# Patient Record
Sex: FEMALE | Race: OTHER | NOT HISPANIC OR LATINO
[De-identification: names, ages, dates, MRNs, and addresses within clinical notes are randomized per-mention and may not be internally consistent; named-entity substitution may affect disease eponyms.]

---

## 2017-01-17 ENCOUNTER — APPOINTMENT (OUTPATIENT)
Dept: OTOLARYNGOLOGY | Facility: CLINIC | Age: 67
End: 2017-01-17

## 2017-01-17 VITALS
HEART RATE: 77 BPM | HEIGHT: 60 IN | DIASTOLIC BLOOD PRESSURE: 73 MMHG | WEIGHT: 140 LBS | BODY MASS INDEX: 27.48 KG/M2 | SYSTOLIC BLOOD PRESSURE: 139 MMHG

## 2017-01-17 DIAGNOSIS — Z82.2 FAMILY HISTORY OF DEAFNESS AND HEARING LOSS: ICD-10-CM

## 2017-01-17 DIAGNOSIS — Z86.39 PERSONAL HISTORY OF OTHER ENDOCRINE, NUTRITIONAL AND METABOLIC DISEASE: ICD-10-CM

## 2017-01-17 DIAGNOSIS — Z88.9 ALLERGY STATUS TO UNSPECIFIED DRUGS, MEDICAMENTS AND BIOLOGICAL SUBSTANCES: ICD-10-CM

## 2017-01-17 DIAGNOSIS — Z78.9 OTHER SPECIFIED HEALTH STATUS: ICD-10-CM

## 2017-01-17 DIAGNOSIS — Z80.9 FAMILY HISTORY OF MALIGNANT NEOPLASM, UNSPECIFIED: ICD-10-CM

## 2018-07-24 PROBLEM — Z78.9 ALCOHOL USE: Status: ACTIVE | Noted: 2017-01-17

## 2018-09-06 ENCOUNTER — RESULT REVIEW (OUTPATIENT)
Age: 68
End: 2018-09-06

## 2020-10-06 ENCOUNTER — APPOINTMENT (OUTPATIENT)
Dept: OTOLARYNGOLOGY | Facility: CLINIC | Age: 70
End: 2020-10-06
Payer: MEDICARE

## 2020-10-06 VITALS
WEIGHT: 137 LBS | TEMPERATURE: 98.2 F | BODY MASS INDEX: 26.9 KG/M2 | SYSTOLIC BLOOD PRESSURE: 152 MMHG | DIASTOLIC BLOOD PRESSURE: 75 MMHG | HEART RATE: 84 BPM | HEIGHT: 60 IN

## 2020-10-06 PROCEDURE — 99204 OFFICE O/P NEW MOD 45 MIN: CPT | Mod: 25

## 2020-10-06 PROCEDURE — 69210 REMOVE IMPACTED EAR WAX UNI: CPT

## 2020-10-06 NOTE — PHYSICAL EXAM
[Midline] : trachea located in midline position [Normal] : no rashes [de-identified] : exostosis of the left EAC

## 2020-10-06 NOTE — HISTORY OF PRESENT ILLNESS
[de-identified] : PAtient has had left ear itching for the last few years that has been progressively worsening over the last few months. SHe was here for the same issue a few years ago. She does not have any pain in the ear but is sticking Qtips in the ears to try and relieve the itching. She has not had any issues with dizziness or ringing in the ears and carvajal snot think that she had any issues with changes in hearing since she was last here \par She does not have any nasal congestion on or runny nose

## 2020-10-06 NOTE — ASSESSMENT
[FreeTextEntry1] : Patient complaining of clogged ears on examination the right ear very narrow ear canal with a lot of wax post narrowness which was curetted out I gave her some drops and she will follow-up in 2 weeks to make sure that everything is okay.

## 2020-10-19 ENCOUNTER — APPOINTMENT (OUTPATIENT)
Dept: OTOLARYNGOLOGY | Facility: CLINIC | Age: 70
End: 2020-10-19
Payer: MEDICARE

## 2020-10-19 VITALS
TEMPERATURE: 98.1 F | SYSTOLIC BLOOD PRESSURE: 162 MMHG | BODY MASS INDEX: 26.9 KG/M2 | HEART RATE: 73 BPM | HEIGHT: 60 IN | WEIGHT: 137 LBS | DIASTOLIC BLOOD PRESSURE: 80 MMHG

## 2020-10-19 DIAGNOSIS — H61.812 EXOSTOSIS OF LEFT EXTERNAL CANAL: ICD-10-CM

## 2020-10-19 DIAGNOSIS — H61.22 IMPACTED CERUMEN, LEFT EAR: ICD-10-CM

## 2020-10-19 PROCEDURE — 99214 OFFICE O/P EST MOD 30 MIN: CPT | Mod: 25

## 2020-10-19 PROCEDURE — 69210 REMOVE IMPACTED EAR WAX UNI: CPT

## 2020-10-19 RX ORDER — LEVOTHYROXINE SODIUM 88 UG/1
88 TABLET ORAL
Qty: 90 | Refills: 0 | Status: ACTIVE | COMMUNITY
Start: 2020-09-15

## 2020-10-19 NOTE — HISTORY OF PRESENT ILLNESS
[de-identified] : Patient is here toady with continued issues with severe itching from the left ear. She does not have any pain and is not having any ringing in the ears or dizziness. The last time she put drops int he ear was about 3 days ago, but she used it for the full 5-7 days as prescribed. SHe does admit that the left is worse, but both ears are very itchy all day

## 2020-10-19 NOTE — ASSESSMENT
[FreeTextEntry1] : Follows up her ear canals finally look good but she continues to have it itchy problems put her on some Elocon cream to use as needed and hopefully this will prevent her from irritating her external auditory canals and causing additional issues.  She will follow-up with us as needed.

## 2020-10-19 NOTE — PHYSICAL EXAM
[Midline] : trachea located in midline position [Normal] : no rashes [de-identified] : exostosis of the left EAC

## 2020-12-09 ENCOUNTER — APPOINTMENT (OUTPATIENT)
Dept: ORTHOPEDIC SURGERY | Facility: CLINIC | Age: 70
End: 2020-12-09
Payer: MEDICARE

## 2020-12-09 DIAGNOSIS — S83.91XA SPRAIN OF UNSPECIFIED SITE OF RIGHT KNEE, INITIAL ENCOUNTER: ICD-10-CM

## 2020-12-09 PROCEDURE — 99203 OFFICE O/P NEW LOW 30 MIN: CPT

## 2020-12-09 PROCEDURE — 73564 X-RAY EXAM KNEE 4 OR MORE: CPT | Mod: RT

## 2020-12-09 NOTE — HISTORY OF PRESENT ILLNESS
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 Again to experience right knee pain and swelling. Reports decreased swelling but experiences intermittent giving out and pain over the lateral aspect of the patella. Denies neurovascular symptoms. Does have a preinjury history of intermittent right knee pain for many years

## 2020-12-09 NOTE — DISCUSSION/SUMMARY
[de-identified] : Impression; Posttraumatic effusion right knee with tenderness lateral facet patella rule out occult fracture or bone contusion Concurrent osteoarthritis right knee\par Plan MRI right knee

## 2020-12-09 NOTE — PHYSICAL EXAM
[de-identified] : Constitutional:Well nourished , well developed and in no acute distress\par Psychiatric: Alert and oriented to time place and person.Appropriate affect\par Respiratory: Unlabored respirations,no audible wheezing\par Cardiovascular: no leg swelling  ankle edema\par Vascular: no calf or thigh tenderness, \par Peripheral pulses; intact\par Skin:Head, neck, arms and lower extremities:no lesions or discoloration\par Lymphatics:No groin adenopathy\par Neurological: intact light touch sensation and grossly intact coordination and motor power.\par Knee;Right: Effusion 1+ neutral alignment flexion right 0/one 20 with pain left 0/1:30 ligaments intact moderate to marked focal tenderness lateral facet patella crepitus Dorsal pedal pulse 1+ is 70 extension 5/5 quadriceps and patellar tendon are intact [de-identified] : X-rays right knee 4 views demonstrates mild right lateral compartment degenerative narrowing small marginal osteophyte, lateral patellofemoral groove marginal osteophytes lateral patellofemoral joint space narrowing

## 2021-01-07 ENCOUNTER — OUTPATIENT (OUTPATIENT)
Dept: OUTPATIENT SERVICES | Facility: HOSPITAL | Age: 71
LOS: 1 days | End: 2021-01-07
Payer: MEDICARE

## 2021-01-07 ENCOUNTER — APPOINTMENT (OUTPATIENT)
Dept: MRI IMAGING | Facility: CLINIC | Age: 71
End: 2021-01-07
Payer: MEDICARE

## 2021-01-07 DIAGNOSIS — Z00.8 ENCOUNTER FOR OTHER GENERAL EXAMINATION: ICD-10-CM

## 2021-01-07 PROCEDURE — 73721 MRI JNT OF LWR EXTRE W/O DYE: CPT

## 2021-01-07 PROCEDURE — 73721 MRI JNT OF LWR EXTRE W/O DYE: CPT | Mod: 26,RT

## 2021-01-20 ENCOUNTER — APPOINTMENT (OUTPATIENT)
Dept: ORTHOPEDIC SURGERY | Facility: CLINIC | Age: 71
End: 2021-01-20
Payer: MEDICARE

## 2021-01-20 VITALS — WEIGHT: 138 LBS | HEIGHT: 61 IN | BODY MASS INDEX: 26.06 KG/M2

## 2021-01-20 PROCEDURE — 99212 OFFICE O/P EST SF 10 MIN: CPT

## 2021-04-01 ENCOUNTER — NON-APPOINTMENT (OUTPATIENT)
Age: 71
End: 2021-04-01

## 2021-04-02 ENCOUNTER — NON-APPOINTMENT (OUTPATIENT)
Age: 71
End: 2021-04-02

## 2021-04-12 ENCOUNTER — NON-APPOINTMENT (OUTPATIENT)
Age: 71
End: 2021-04-12

## 2021-04-12 ENCOUNTER — APPOINTMENT (OUTPATIENT)
Dept: CARDIOLOGY | Facility: CLINIC | Age: 71
End: 2021-04-12
Payer: MEDICARE

## 2021-04-12 VITALS
OXYGEN SATURATION: 98 % | HEART RATE: 72 BPM | SYSTOLIC BLOOD PRESSURE: 170 MMHG | BODY MASS INDEX: 27 KG/M2 | HEIGHT: 61 IN | DIASTOLIC BLOOD PRESSURE: 88 MMHG | WEIGHT: 143 LBS | TEMPERATURE: 96.9 F

## 2021-04-12 VITALS — DIASTOLIC BLOOD PRESSURE: 72 MMHG | SYSTOLIC BLOOD PRESSURE: 156 MMHG

## 2021-04-12 DIAGNOSIS — Z13.228 ENCOUNTER FOR SCREENING FOR OTHER METABOLIC DISORDERS: ICD-10-CM

## 2021-04-12 DIAGNOSIS — E04.9 NONTOXIC GOITER, UNSPECIFIED: ICD-10-CM

## 2021-04-12 DIAGNOSIS — Z85.42 PERSONAL HISTORY OF MALIGNANT NEOPLASM OF OTHER PARTS OF UTERUS: ICD-10-CM

## 2021-04-12 DIAGNOSIS — Z85.3 PERSONAL HISTORY OF MALIGNANT NEOPLASM OF BREAST: ICD-10-CM

## 2021-04-12 DIAGNOSIS — Z71.89 OTHER SPECIFIED COUNSELING: ICD-10-CM

## 2021-04-12 PROCEDURE — 93000 ELECTROCARDIOGRAM COMPLETE: CPT

## 2021-04-12 PROCEDURE — 99204 OFFICE O/P NEW MOD 45 MIN: CPT

## 2021-04-12 RX ORDER — AMLODIPINE BESYLATE 5 MG/1
5 TABLET ORAL DAILY
Qty: 90 | Refills: 1 | Status: ACTIVE | COMMUNITY
Start: 2021-04-12 | End: 1900-01-01

## 2021-04-12 NOTE — REASON FOR VISIT
[Initial Evaluation] : an initial evaluation of [Hypertension] : hypertension [Syncope] : syncope [FreeTextEntry1] : Cardiac Evaluation

## 2021-04-12 NOTE — PHYSICAL EXAM
[General Appearance - Well Developed] : well developed [Normal Appearance] : normal appearance [Well Groomed] : well groomed [No Deformities] : no deformities [General Appearance - Well Nourished] : well nourished [General Appearance - In No Acute Distress] : no acute distress [Normal Conjunctiva] : the conjunctiva exhibited no abnormalities [Eyelids - No Xanthelasma] : the eyelids demonstrated no xanthelasmas [Normal Jugular Venous A Waves Present] : normal jugular venous A waves present [Normal Jugular Venous V Waves Present] : normal jugular venous V waves present [No Jugular Venous Nevarez A Waves] : no jugular venous nevarez A waves [Heart Rate And Rhythm] : heart rate and rhythm were normal [Heart Sounds] : normal S1 and S2 [Murmurs] : no murmurs present [Respiration, Rhythm And Depth] : normal respiratory rhythm and effort [Exaggerated Use Of Accessory Muscles For Inspiration] : no accessory muscle use [Auscultation Breath Sounds / Voice Sounds] : lungs were clear to auscultation bilaterally [Abdomen Soft] : soft [Abdomen Tenderness] : non-tender [Abdomen Mass (___ Cm)] : no abdominal mass palpated [Abnormal Walk] : normal gait [Gait - Sufficient For Exercise Testing] : the gait was sufficient for exercise testing [Nail Clubbing] : no clubbing of the fingernails [Cyanosis, Localized] : no localized cyanosis [Petechial Hemorrhages (___cm)] : no petechial hemorrhages [Skin Color & Pigmentation] : normal skin color and pigmentation [] : no rash [No Venous Stasis] : no venous stasis [Skin Lesions] : no skin lesions [No Skin Ulcers] : no skin ulcer [No Xanthoma] : no  xanthoma was observed [Oriented To Time, Place, And Person] : oriented to person, place, and time [Affect] : the affect was normal [Mood] : the mood was normal [No Anxiety] : not feeling anxious [Normal Oral Mucosa] : normal oral mucosa [No Oral Pallor] : no oral pallor [No Oral Cyanosis] : no oral cyanosis

## 2021-04-12 NOTE — HISTORY OF PRESENT ILLNESS
[FreeTextEntry1] : This is a 71 year old lady with a PMH of HLD, Goiter, Breast CA, and Uterine CA presents today for cardiac evaluation. Patient states that on Monday March 29, 2021, she explains that she fell and does not remember anything else at that time. Patient denies hitting her head. Patient does not know if she lost consciousness. Patient also notes that her BP has been elevated for the past two weeks. Patient denies a headache at this time. Patient states that she is under significant stress at this time at home and with regards to her husbands health. Patient admits to occasional light  headedness. Today, Patient denies dyspnea, palpitations, chest pain, nausea, vomiting, dizziness. pt with hx of snoring

## 2021-04-16 ENCOUNTER — APPOINTMENT (OUTPATIENT)
Dept: CARDIOLOGY | Facility: CLINIC | Age: 71
End: 2021-04-16
Payer: MEDICARE

## 2021-04-16 DIAGNOSIS — E78.00 PURE HYPERCHOLESTEROLEMIA, UNSPECIFIED: ICD-10-CM

## 2021-04-16 LAB
25(OH)D3 SERPL-MCNC: 42.3 NG/ML
ALBUMIN SERPL ELPH-MCNC: 4.7 G/DL
ALP BLD-CCNC: 72 U/L
ALT SERPL-CCNC: 19 U/L
ANION GAP SERPL CALC-SCNC: 13 MMOL/L
APPEARANCE: CLEAR
AST SERPL-CCNC: 19 U/L
BACTERIA: NEGATIVE
BASOPHILS # BLD AUTO: 0.08 K/UL
BASOPHILS NFR BLD AUTO: 0.9 %
BILIRUB DIRECT SERPL-MCNC: 0.1 MG/DL
BILIRUB INDIRECT SERPL-MCNC: 0.2 MG/DL
BILIRUB SERPL-MCNC: 0.3 MG/DL
BILIRUBIN URINE: NEGATIVE
BLOOD URINE: NORMAL
BUN SERPL-MCNC: 13 MG/DL
CALCIUM OXALATE CRYSTALS: ABNORMAL
CALCIUM SERPL-MCNC: 10.3 MG/DL
CHLORIDE SERPL-SCNC: 103 MMOL/L
CHOLEST SERPL-MCNC: 228 MG/DL
CO2 SERPL-SCNC: 24 MMOL/L
COLOR: NORMAL
CREAT SERPL-MCNC: 0.65 MG/DL
EOSINOPHIL # BLD AUTO: 0.06 K/UL
EOSINOPHIL NFR BLD AUTO: 0.6 %
ESTIMATED AVERAGE GLUCOSE: 111 MG/DL
GLUCOSE QUALITATIVE U: NEGATIVE
GLUCOSE SERPL-MCNC: 92 MG/DL
HBA1C MFR BLD HPLC: 5.5 %
HCT VFR BLD CALC: 46.8 %
HDLC SERPL-MCNC: 55 MG/DL
HGB BLD-MCNC: 15 G/DL
HYALINE CASTS: 0 /LPF
IMM GRANULOCYTES NFR BLD AUTO: 0.2 %
KETONES URINE: NEGATIVE
LDLC SERPL CALC-MCNC: 153 MG/DL
LEUKOCYTE ESTERASE URINE: NEGATIVE
LYMPHOCYTES # BLD AUTO: 1.66 K/UL
LYMPHOCYTES NFR BLD AUTO: 17.8 %
MAN DIFF?: NORMAL
MCHC RBC-ENTMCNC: 28.2 PG
MCHC RBC-ENTMCNC: 32.1 GM/DL
MCV RBC AUTO: 88 FL
MICROSCOPIC-UA: NORMAL
MONOCYTES # BLD AUTO: 0.48 K/UL
MONOCYTES NFR BLD AUTO: 5.1 %
NEUTROPHILS # BLD AUTO: 7.04 K/UL
NEUTROPHILS NFR BLD AUTO: 75.4 %
NITRITE URINE: NEGATIVE
NONHDLC SERPL-MCNC: 173 MG/DL
PH URINE: 6.5
PLATELET # BLD AUTO: 313 K/UL
POTASSIUM SERPL-SCNC: 4.2 MMOL/L
PROT SERPL-MCNC: 8 G/DL
PROTEIN URINE: NORMAL
RBC # BLD: 5.32 M/UL
RBC # FLD: 13.2 %
RED BLOOD CELLS URINE: 5 /HPF
SODIUM SERPL-SCNC: 140 MMOL/L
SPECIFIC GRAVITY URINE: 1.02
SQUAMOUS EPITHELIAL CELLS: 7 /HPF
T4 FREE SERPL-MCNC: 1.7 NG/DL
TRIGL SERPL-MCNC: 96 MG/DL
TSH SERPL-ACNC: 0.83 UIU/ML
UROBILINOGEN URINE: NORMAL
WBC # FLD AUTO: 9.34 K/UL
WHITE BLOOD CELLS URINE: 2 /HPF

## 2021-04-16 PROCEDURE — 78452 HT MUSCLE IMAGE SPECT MULT: CPT

## 2021-04-16 PROCEDURE — 93015 CV STRESS TEST SUPVJ I&R: CPT

## 2021-04-16 PROCEDURE — A9500: CPT

## 2021-04-16 PROCEDURE — 93306 TTE W/DOPPLER COMPLETE: CPT

## 2021-04-16 PROCEDURE — 93880 EXTRACRANIAL BILAT STUDY: CPT

## 2021-04-18 LAB
APPEARANCE: CLEAR
BACTERIA: NEGATIVE
BILIRUBIN URINE: NEGATIVE
BLOOD URINE: ABNORMAL
COLOR: NORMAL
GLUCOSE QUALITATIVE U: NEGATIVE
HYALINE CASTS: 0 /LPF
KETONES URINE: NEGATIVE
LEUKOCYTE ESTERASE URINE: NEGATIVE
MICROSCOPIC-UA: NORMAL
NITRITE URINE: NEGATIVE
PH URINE: 6.5
PROTEIN URINE: NORMAL
RED BLOOD CELLS URINE: 1 /HPF
SPECIFIC GRAVITY URINE: 1.01
SQUAMOUS EPITHELIAL CELLS: 4 /HPF
UROBILINOGEN URINE: NORMAL
WHITE BLOOD CELLS URINE: 2 /HPF

## 2021-04-25 ENCOUNTER — APPOINTMENT (OUTPATIENT)
Dept: MRI IMAGING | Facility: CLINIC | Age: 71
End: 2021-04-25
Payer: MEDICARE

## 2021-04-25 ENCOUNTER — OUTPATIENT (OUTPATIENT)
Dept: OUTPATIENT SERVICES | Facility: HOSPITAL | Age: 71
LOS: 1 days | End: 2021-04-25
Payer: MEDICARE

## 2021-04-25 DIAGNOSIS — R55 SYNCOPE AND COLLAPSE: ICD-10-CM

## 2021-04-25 LAB — BACTERIA UR CULT: ABNORMAL

## 2021-04-25 PROCEDURE — 93224 XTRNL ECG REC UP TO 48 HRS: CPT

## 2021-04-25 PROCEDURE — 70551 MRI BRAIN STEM W/O DYE: CPT

## 2021-04-25 PROCEDURE — 70551 MRI BRAIN STEM W/O DYE: CPT | Mod: 26,MF

## 2021-04-25 PROCEDURE — G1004: CPT

## 2021-04-26 DIAGNOSIS — N39.0 URINARY TRACT INFECTION, SITE NOT SPECIFIED: ICD-10-CM

## 2021-05-06 ENCOUNTER — NON-APPOINTMENT (OUTPATIENT)
Age: 71
End: 2021-05-06

## 2021-05-07 ENCOUNTER — NON-APPOINTMENT (OUTPATIENT)
Age: 71
End: 2021-05-07

## 2021-05-26 ENCOUNTER — APPOINTMENT (OUTPATIENT)
Dept: CARDIOLOGY | Facility: CLINIC | Age: 71
End: 2021-05-26
Payer: MEDICARE

## 2021-05-26 VITALS
HEIGHT: 61 IN | BODY MASS INDEX: 27 KG/M2 | OXYGEN SATURATION: 97 % | TEMPERATURE: 98.9 F | SYSTOLIC BLOOD PRESSURE: 144 MMHG | HEART RATE: 78 BPM | WEIGHT: 143 LBS | DIASTOLIC BLOOD PRESSURE: 80 MMHG

## 2021-05-26 DIAGNOSIS — R55 SYNCOPE AND COLLAPSE: ICD-10-CM

## 2021-05-26 DIAGNOSIS — I10 ESSENTIAL (PRIMARY) HYPERTENSION: ICD-10-CM

## 2021-05-26 DIAGNOSIS — D22.9 MELANOCYTIC NEVI, UNSPECIFIED: ICD-10-CM

## 2021-05-26 DIAGNOSIS — E78.5 HYPERLIPIDEMIA, UNSPECIFIED: ICD-10-CM

## 2021-05-26 DIAGNOSIS — R42 DIZZINESS AND GIDDINESS: ICD-10-CM

## 2021-05-26 PROCEDURE — 99213 OFFICE O/P EST LOW 20 MIN: CPT

## 2021-05-26 NOTE — HISTORY OF PRESENT ILLNESS
[FreeTextEntry1] : This is a 71 year old lady who presents today for follow up after being seen in our office on April 12, 2021. Patient had complete cardiac workup including Nuclear, ECHO, Holter,  MR Head, Carotid Doppler and was seen by neurologist for workup for syncope. Patient reports no further episodes of syncope. Patient states that she is feeling good and  no longer has episodes of light headedness. Patient states that she has started the cholesterol medication and is feeling fine on the medication. Patient also taking the BP medication and is doing well on the medication with no symptoms associated. Patient was recently treated with ABX for UTI and is not experiencing any urinary symptoms. Patient to recheck UA and UC today. Patient to see urology for Microscopic hematuria. Patient for FLORINA evaluation for June 1, 2021.

## 2021-06-01 ENCOUNTER — APPOINTMENT (OUTPATIENT)
Dept: PULMONOLOGY | Facility: CLINIC | Age: 71
End: 2021-06-01
Payer: MEDICARE

## 2021-06-01 VITALS
HEART RATE: 110 BPM | SYSTOLIC BLOOD PRESSURE: 131 MMHG | OXYGEN SATURATION: 98 % | BODY MASS INDEX: 27 KG/M2 | RESPIRATION RATE: 16 BRPM | HEIGHT: 61 IN | DIASTOLIC BLOOD PRESSURE: 80 MMHG | TEMPERATURE: 97.6 F | WEIGHT: 143 LBS

## 2021-06-01 DIAGNOSIS — Z80.0 FAMILY HISTORY OF MALIGNANT NEOPLASM OF DIGESTIVE ORGANS: ICD-10-CM

## 2021-06-01 DIAGNOSIS — R06.83 SNORING: ICD-10-CM

## 2021-06-01 DIAGNOSIS — Z87.01 PERSONAL HISTORY OF PNEUMONIA (RECURRENT): ICD-10-CM

## 2021-06-01 DIAGNOSIS — Z86.39 PERSONAL HISTORY OF OTHER ENDOCRINE, NUTRITIONAL AND METABOLIC DISEASE: ICD-10-CM

## 2021-06-01 DIAGNOSIS — Z86.79 PERSONAL HISTORY OF OTHER DISEASES OF THE CIRCULATORY SYSTEM: ICD-10-CM

## 2021-06-01 DIAGNOSIS — Z87.898 PERSONAL HISTORY OF OTHER SPECIFIED CONDITIONS: ICD-10-CM

## 2021-06-01 DIAGNOSIS — Z82.3 FAMILY HISTORY OF STROKE: ICD-10-CM

## 2021-06-01 DIAGNOSIS — Z82.49 FAMILY HISTORY OF ISCHEMIC HEART DISEASE AND OTHER DISEASES OF THE CIRCULATORY SYSTEM: ICD-10-CM

## 2021-06-01 PROCEDURE — 99204 OFFICE O/P NEW MOD 45 MIN: CPT | Mod: 25

## 2021-06-01 PROCEDURE — 71046 X-RAY EXAM CHEST 2 VIEWS: CPT

## 2021-06-01 RX ORDER — NITROFURANTOIN (MONOHYDRATE/MACROCRYSTALS) 25; 75 MG/1; MG/1
100 CAPSULE ORAL
Qty: 14 | Refills: 0 | Status: DISCONTINUED | COMMUNITY
Start: 2021-04-26 | End: 2021-06-01

## 2021-06-01 NOTE — PHYSICAL EXAM
[No Acute Distress] : no acute distress [Normal Oropharynx] : normal oropharynx [III] : Mallampati Class: III [Normal Appearance] : normal appearance [No Neck Mass] : no neck mass [Normal Rate/Rhythm] : normal rate/rhythm [Murmur ___ / 6] : murmur [unfilled] / 6 [Normal S1, S2] : normal s1, s2 [No Resp Distress] : no resp distress [Clear to Auscultation Bilaterally] : clear to auscultation bilaterally [Benign] : benign [Normal Gait] : normal gait [No Clubbing] : no clubbing [No Cyanosis] : no cyanosis [No Edema] : no edema [FROM] : FROM [Normal Color/ Pigmentation] : normal color/ pigmentation [No Focal Deficits] : no focal deficits [Oriented x3] : oriented x3 [Normal Affect] : normal affect [TextBox_68] : I:E ratio 1:3; clear  [TextBox_80] : mild kyphosis

## 2021-06-01 NOTE — HISTORY OF PRESENT ILLNESS
[TextBox_4] : Ms. MARIN is a 71 year old female presenting to the office today for initial pulmonary evaluation. Her chief complaint is\par -she reports feeling generally well\par -she notes she has difficulty walking up stairs due to her knee\par -she states she has an occasional episode of dysphagia for the past 2 years - can occur at any meal. She tries to chew slowly and chew the food well. It can occur with solids or liquids. She describes it as she is unable to breathe or continue eating until she burps it out.\par -she notes she has occasion reflux (not food though)\par -she notes she has arthritis pain due to weather changes\par -she reports her energy level is 7-8/10\par -she could NOT fall asleep while watching a boring TV show\par -she could NOT fall asleep in a car\par -she notes her memory and concentration are good \par -she notes she has been snoring for a few years. Her  tells her it has gotten worse, and has witnessed apneas\par -she notes her vision is good\par -she states she once had PNA 30 years ago\par -she notes she has something in her throat - lower esophagus that she wants to clear. She has trouble clearing it, but once it clears, she feels better\par -she states she had an episode of syncope on 4/8-9/2021 with a crossover of stress\par -she notes she walks 2-3 miles per day\par -she denies any bronchitis, nocturia, allergy sx, chest pain, chest pressure, diarrhea, constipation, dysphagia, dizziness, leg swelling, leg pain, itchy eyes, itchy ears, heartburn, reflux, sour taste in the mouth, myalgias or arthralgias.

## 2021-06-01 NOTE — REVIEW OF SYSTEMS
[GERD] : gerd [Dysphagia] : dysphagia [Arthralgias] : arthralgias [Chronic Pain] : chronic pain [Negative] : Endocrine [EDS] : no EDS [Chest Discomfort] : no chest discomfort [Diarrhea] : no diarrhea [Constipation] : no constipation [Dizziness] : no dizziness [TextBox_69] : throat sensation when swallowing

## 2021-06-01 NOTE — ADDENDUM
[FreeTextEntry1] : Documented by Bakari Lozada acting as a scribe for Dr. Toney Bunch on 06/01/2021.\par \par All medical record entries made by the Scribe were at my, Dr. Toney Bunch's, direction and personally dictated by me on 06/01/2021. I have reviewed the chart and agree that the record accurately reflects my personal performance of the history, physical exam, assessment and plan. I have also personally directed, reviewed, and agree with the discharge instructions.

## 2021-06-01 NOTE — PROCEDURE
[FreeTextEntry1] : CXR reveals a normal sized heart; no evidence of infiltrate or effusion s/p left mastectomy\par \par MRI of brain revealed periventricular subcortical white matter chronic changes. No hemorrhage.\par \par Echo reveals normal grade 1 diastolic, dysfunction, mild LVH\par \par Carotid Dopplers nml , Less than 50% stenosis on the left ICA.\par \par -Images, blood work, and procedures reviewed in detail and discussed with patient in office.

## 2021-06-01 NOTE — ASSESSMENT
[FreeTextEntry1] : Ms. MARIN is a 71 year old female with a history of breast CA 40+ years ago s/p right mastectomy, uterine CA s/p hysterectomy and oophorectomy, HTN, HLD, goiter, osteoarthritis of the both knees, anxiety, who comes into the office today for pulmonary evaluation for ?FLORINA, snoring, ?LPR / esophageal spasm\par \par The patient's shortness of breath is multifactorial due to:\par -pulmonary disease \par      -pending PFT - doubting any disease\par -poor breathing mechanics \par -overweight/out of shape\par -?cardiac disease \par \par Problem 1: ?Esophageal Spasm / ?LPR\par -Protonix 40 mg before breakfast\par -consider esophageal motility test if symptom doesn’t clear\par \par -Rule of 2s: avoid eating too much, eating too fast, eating too late, eating too spicy, eating too lousy, eating two hours before bed.\par -Things to avoid including overeating, spicy foods, tight clothing, eating within three hours of bed, this list is not all inclusive. \par -For treatment of reflux, possible options discussed including diet control, H2 blockers, PPIs, as well as coating motility agents discussed as treatment options. Timing of meals and proximity of last meal to sleep were discussed. If symptoms persist, a formal gastrointestinal evaluation is needed. \par \par Problem 2: Snoring - r/o FLORINA\par -Recommended to complete a home sleep study due to her snoring, witnessed apneas\par Sleep apnea is associated with adverse clinical consequences which an affect most organ systems. Cardiovascular disease risk includes arrhythmias, atrial fibrillation, hypertension, coronary artery disease, and stroke. Metabolic disorders include diabetes type 2, non-alcoholic fatty liver disease. Mood disorder especially depression; and cognitive decline especially in the elderly. Associations with chronic reflux/Foley’s esophagus some but not all inclusive. \par -Reasons include arousal consistent with hypopnea; respiratory events most prominent in REM sleep or supine position; therefore sleep staging and body position are important for accurate diagnosis and estimation of AHI. \par \par Problem 3: Poor Mechanics of Breathing\par - Proper breathing techniques were reviewed with an emphasis of exhalation. Patient instructed to breath in for 1 second and out for four seconds. Patient was encouraged to not talk while walking. \par \par Problem 4: Overweight\par -Weight loss, exercise, and diet control were discussed and are highly encouraged. Treatment options were given such as, aqua therapy, and contacting a nutritionist. Recommended to use the elliptical, stationary bike, less use of treadmill. Mindful eating was explained to the patient Obesity is associated with worsening asthma, shortness of breath, and potential for cardiac disease, diabetes, and other underlying medical conditions. \par \par Problem 5: Cardiac\par -recommended to follow up with a cardiologist (Arpan)\par \par Problem 6: health maintenance\par -s/p influenza vaccine\par -recommended strep pneumonia vaccines after age 65: Prevnar-13 vaccine, followed by Pneumo vaccine 23 one year following\par -recommended early intervention for URIs\par -recommended regular osteoporosis evaluations\par -recommended early dermatological evaluations\par -recommended after the age of 50 to the age of 70, colonoscopy every 5 years \par \par  Follow up in 6-8 weeks\par -he  is recommended to call with any changes, questions, or concerns.

## 2021-06-02 ENCOUNTER — NON-APPOINTMENT (OUTPATIENT)
Age: 71
End: 2021-06-02

## 2021-06-02 LAB
ALBUMIN SERPL ELPH-MCNC: 4.7 G/DL
ALP BLD-CCNC: 72 U/L
ALT SERPL-CCNC: 22 U/L
ANION GAP SERPL CALC-SCNC: 14 MMOL/L
APPEARANCE: ABNORMAL
AST SERPL-CCNC: 24 U/L
BACTERIA UR CULT: NORMAL
BACTERIA: NEGATIVE
BILIRUB DIRECT SERPL-MCNC: 0.1 MG/DL
BILIRUB INDIRECT SERPL-MCNC: 0.3 MG/DL
BILIRUB SERPL-MCNC: 0.4 MG/DL
BILIRUBIN URINE: NEGATIVE
BLOOD URINE: NEGATIVE
BUN SERPL-MCNC: 13 MG/DL
CALCIUM OXALATE CRYSTALS: ABNORMAL
CALCIUM SERPL-MCNC: 9.9 MG/DL
CHLORIDE SERPL-SCNC: 103 MMOL/L
CK SERPL-CCNC: 105 U/L
CO2 SERPL-SCNC: 24 MMOL/L
COLOR: NORMAL
CREAT SERPL-MCNC: 0.53 MG/DL
GLUCOSE QUALITATIVE U: NEGATIVE
GLUCOSE SERPL-MCNC: 92 MG/DL
HYALINE CASTS: 0 /LPF
KETONES URINE: NEGATIVE
LDLC SERPL DIRECT ASSAY-MCNC: 76 MG/DL
LEUKOCYTE ESTERASE URINE: NEGATIVE
MICROSCOPIC-UA: NORMAL
NITRITE URINE: NEGATIVE
PH URINE: 6.5
POTASSIUM SERPL-SCNC: 3.6 MMOL/L
PROT SERPL-MCNC: 8 G/DL
PROTEIN URINE: NEGATIVE
RED BLOOD CELLS URINE: 3 /HPF
SODIUM SERPL-SCNC: 141 MMOL/L
SPECIFIC GRAVITY URINE: 1.02
SQUAMOUS EPITHELIAL CELLS: 1 /HPF
UROBILINOGEN URINE: NORMAL
WHITE BLOOD CELLS URINE: 1 /HPF

## 2021-06-28 ENCOUNTER — NON-APPOINTMENT (OUTPATIENT)
Age: 71
End: 2021-06-28

## 2021-07-10 ENCOUNTER — RX RENEWAL (OUTPATIENT)
Age: 71
End: 2021-07-10

## 2021-07-10 RX ORDER — ATORVASTATIN CALCIUM 20 MG/1
20 TABLET, FILM COATED ORAL DAILY
Qty: 90 | Refills: 0 | Status: ACTIVE | COMMUNITY
Start: 2021-04-16 | End: 1900-01-01

## 2021-07-17 ENCOUNTER — LABORATORY RESULT (OUTPATIENT)
Age: 71
End: 2021-07-17

## 2021-07-21 ENCOUNTER — APPOINTMENT (OUTPATIENT)
Dept: PULMONOLOGY | Facility: CLINIC | Age: 71
End: 2021-07-21
Payer: MEDICARE

## 2021-07-21 VITALS
TEMPERATURE: 97.3 F | DIASTOLIC BLOOD PRESSURE: 70 MMHG | HEIGHT: 60 IN | HEART RATE: 111 BPM | BODY MASS INDEX: 28.27 KG/M2 | OXYGEN SATURATION: 97 % | WEIGHT: 144 LBS | RESPIRATION RATE: 16 BRPM | SYSTOLIC BLOOD PRESSURE: 140 MMHG

## 2021-07-21 PROCEDURE — 95012 NITRIC OXIDE EXP GAS DETER: CPT

## 2021-07-21 PROCEDURE — ZZZZZ: CPT

## 2021-07-21 PROCEDURE — 94010 BREATHING CAPACITY TEST: CPT

## 2021-07-21 PROCEDURE — 94727 GAS DIL/WSHOT DETER LNG VOL: CPT

## 2021-07-21 PROCEDURE — 99214 OFFICE O/P EST MOD 30 MIN: CPT | Mod: 25

## 2021-07-21 PROCEDURE — 94729 DIFFUSING CAPACITY: CPT

## 2021-07-21 NOTE — HISTORY OF PRESENT ILLNESS
[TextBox_4] : Ms. MARIN is a 71 year old female presenting to the office today for f/p pulmonary evaluation. Her chief complaint is\par \par -she notes her energy levels are alright\par -She notes having an achy back \par -She notes having bad knees \par -She notes having issues walking up the stairs \par -She denies constipation \par -She denies dysphasia \par -She notes having some gassiness while eating but is controlled with rx prescribed \par -she notes gaining weight even though she eats less\par -She states she tries eating healthy \par - she completed an ECHO in April\par -She notes not passing out anymore \par \par \par patient denies any headaches, nausea, vomiting, fever, chills, sweats, chest pain, chest pressure, palpitations, coughing, wheezing, fatigue, diarrhea, constipation, dysphagia, myalgias, dizziness, leg swelling, leg pain, itchy eyes, itchy ears, heartburn, reflux or sour taste in the mouth

## 2021-07-21 NOTE — PROCEDURE
[FreeTextEntry1] : Sleep study (6/11/21) shows she stops breathing 33 times/hr with findings that are consistent with severe obstructive sleep apnea \par \par Full PFT revealed normal flows, with a FEV1 of 2.41L, which is 120% of predicted, normal lung volumes, and a diffusion of , which is % of predicted, with a normal flow volume loop \par \par \par Feno was 43; a normal value being less than 25. Fractional exhaled nitric oxide (FENO) is regarded as a simple, noninvasive method for assessing eosinophilic airway inflammation. Produced by a variety of cells within the lung, nitric oxide (NO) concentrations are generally low in healthy individuals. However, high concentrations of NO appear to be involved in nonspecific host defense mechanisms and chronic inflammatory  diseases such as asthma. The American Thoracic Society (ATS) therefore recommended using FENO to aid in the diagnosis and monitoring of eosinophilic airway inflammation and asthma, and for identifying steroid responsive individuals whose chronic respiratory symptoms may be caused by airway inflammation

## 2021-07-21 NOTE — ASSESSMENT
[FreeTextEntry1] : Ms. MARIN is a 71 year old female with a history of breast CA 40+ years ago s/p right mastectomy, uterine CA s/p hysterectomy and oophorectomy, HTN, HLD, goiter, osteoarthritis of the both knees, anxiety, who comes into the office today for pulmonary evaluation for (+)FLORINA, snoring, (+)LPR / esophageal spasm\par \par The patient's shortness of breath is multifactorial due to:\par -pulmonary disease \par      -pending PFT - doubting any disease\par -poor breathing mechanics \par -overweight/out of shape\par -?cardiac disease \par \par Problem 1: (+)Esophageal Spasm / (+)LPR\par -Protonix 40 mg before breakfast\par -consider esophageal motility test if symptom doesn’t clear\par \par -Rule of 2s: avoid eating too much, eating too fast, eating too late, eating too spicy, eating too lousy, eating two hours before bed.\par -Things to avoid including overeating, spicy foods, tight clothing, eating within three hours of bed, this list is not all inclusive. \par -For treatment of reflux, possible options discussed including diet control, H2 blockers, PPIs, as well as coating motility agents discussed as treatment options. Timing of meals and proximity of last meal to sleep were discussed. If symptoms persist, a formal gastrointestinal evaluation is needed. \par \par Problem 2: Snoring - (+) FLORINA severe\par -CPAP study pending \par -Recommended to complete a home sleep study due to her snoring, witnessed apneas\par Sleep apnea is associated with adverse clinical consequences which an affect most organ systems. Cardiovascular disease risk includes arrhythmias, atrial fibrillation, hypertension, coronary artery disease, and stroke. Metabolic disorders include diabetes type 2, non-alcoholic fatty liver disease. Mood disorder especially depression; and cognitive decline especially in the elderly. Associations with chronic reflux/Foley’s esophagus some but not all inclusive. \par -Reasons include arousal consistent with hypopnea; respiratory events most prominent in REM sleep or supine position; therefore sleep staging and body position are important for accurate diagnosis and estimation of AHI. \par \par Problem 3: Poor Mechanics of Breathing\par - Proper breathing techniques were reviewed with an emphasis of exhalation. Patient instructed to breath in for 1 second and out for four seconds. Patient was encouraged to not talk while walking. \par \par Problem 4: Overweight\par -Weight loss, exercise, and diet control were discussed and are highly encouraged. Treatment options were given such as, aqua therapy, and contacting a nutritionist. Recommended to use the elliptical, stationary bike, less use of treadmill. Mindful eating was explained to the patient Obesity is associated with worsening asthma, shortness of breath, and potential for cardiac disease, diabetes, and other underlying medical conditions. \par \par Problem 5: Cardiac\par - S/p Covid 19 vaccine (Pfizer) x2 \par -recommended to follow up with a cardiologist (Arpan)\par \par Problem 6: health maintenance\par -s/p influenza vaccine\par -recommended strep pneumonia vaccines after age 65: Prevnar-13 vaccine, followed by Pneumo vaccine 23 one year following\par -recommended early intervention for URIs\par -recommended regular osteoporosis evaluations\par -recommended early dermatological evaluations\par -recommended after the age of 50 to the age of 70, colonoscopy every 5 years \par \par  Follow up in 6-8 weeks\par -he  is recommended to call with any changes, questions, or concerns.

## 2021-07-21 NOTE — ADDENDUM
[FreeTextEntry1] : Documented by Moiz Dial acting as a scribe for Dr. Toney Bunch on (07/21/2021).\par \par All medical record entries made by the Scribe were at my, Dr. Toney Bunch's, direction and personally dictated by me on (07/21/2021). I have reviewed the chart and agree that the record accurately reflects my personal performance of the history, physical exam, assessment and plan. I have also personally directed, reviewed, and agree with the discharge instructions.\par

## 2021-07-22 ENCOUNTER — APPOINTMENT (OUTPATIENT)
Dept: UROLOGY | Facility: CLINIC | Age: 71
End: 2021-07-22
Payer: MEDICARE

## 2021-07-22 ENCOUNTER — RESULT REVIEW (OUTPATIENT)
Age: 71
End: 2021-07-22

## 2021-07-22 DIAGNOSIS — R82.90 UNSPECIFIED ABNORMAL FINDINGS IN URINE: ICD-10-CM

## 2021-07-22 PROCEDURE — 99204 OFFICE O/P NEW MOD 45 MIN: CPT

## 2021-07-25 PROBLEM — R82.90 ABNORMAL URINALYSIS: Status: ACTIVE | Noted: 2021-04-16

## 2021-07-25 NOTE — HISTORY OF PRESENT ILLNESS
[FreeTextEntry1] : 71 year old F with cc of microscopic hematuria. Pt was seen by PCP for routine exam and found to have blood in the urine. Review of UAs shows 3 and 5 RBC. She had CaOx crystals noted. She has never been told this in the past. Denies gross hematuria. Has a hx of stone x1 in the past that was tx with ESWL, >10y ago. No issues with UTIs. No tobacco hx. No exposure hx. No family hx of  malignancy. Father with colon ca. Personal hx of breast and uterine ca. Breast ca tx with mastectomy and radiation. Neither with chemo. \par \par At baseline, no urinary complaints. Can hold her urine.

## 2021-07-30 ENCOUNTER — OUTPATIENT (OUTPATIENT)
Dept: OUTPATIENT SERVICES | Facility: HOSPITAL | Age: 71
LOS: 1 days | End: 2021-07-30
Payer: MEDICARE

## 2021-07-30 ENCOUNTER — APPOINTMENT (OUTPATIENT)
Dept: CT IMAGING | Facility: CLINIC | Age: 71
End: 2021-07-30
Payer: MEDICARE

## 2021-07-30 DIAGNOSIS — R31.29 OTHER MICROSCOPIC HEMATURIA: ICD-10-CM

## 2021-07-30 PROCEDURE — 82565 ASSAY OF CREATININE: CPT

## 2021-07-30 PROCEDURE — 74178 CT ABD&PLV WO CNTR FLWD CNTR: CPT | Mod: 26,MH

## 2021-07-30 PROCEDURE — 74178 CT ABD&PLV WO CNTR FLWD CNTR: CPT

## 2021-08-26 ENCOUNTER — RESULT REVIEW (OUTPATIENT)
Age: 71
End: 2021-08-26

## 2021-09-14 ENCOUNTER — APPOINTMENT (OUTPATIENT)
Dept: ORTHOPEDIC SURGERY | Facility: CLINIC | Age: 71
End: 2021-09-14
Payer: MEDICARE

## 2021-09-14 VITALS
BODY MASS INDEX: 26.81 KG/M2 | HEART RATE: 92 BPM | SYSTOLIC BLOOD PRESSURE: 142 MMHG | WEIGHT: 142 LBS | HEIGHT: 61 IN | DIASTOLIC BLOOD PRESSURE: 76 MMHG | OXYGEN SATURATION: 96 %

## 2021-09-14 DIAGNOSIS — M17.11 UNILATERAL PRIMARY OSTEOARTHRITIS, RIGHT KNEE: ICD-10-CM

## 2021-09-14 DIAGNOSIS — I10 ESSENTIAL (PRIMARY) HYPERTENSION: ICD-10-CM

## 2021-09-14 PROCEDURE — 73564 X-RAY EXAM KNEE 4 OR MORE: CPT | Mod: RT

## 2021-09-14 PROCEDURE — 99213 OFFICE O/P EST LOW 20 MIN: CPT

## 2021-09-14 RX ORDER — DICLOFENAC SODIUM 75 MG/1
75 TABLET, DELAYED RELEASE ORAL
Qty: 60 | Refills: 0 | Status: ACTIVE | COMMUNITY
Start: 2021-09-14 | End: 1900-01-01

## 2021-09-14 NOTE — HISTORY OF PRESENT ILLNESS
[Worsening] : worsening [___ wks] : [unfilled] week(s) ago [4] : a current pain level of 4/10 [8] : a maximum pain level of 8/10 [Intermit.] : ~He/She~ states the symptoms seem to be intermittent [Walking] : worsened by walking [Knee Flexion] : worsened with knee flexion [Knee Extension] : worsened with knee extension [Ice] : relieved by ice [Rest] : relieved by rest [de-identified] : Pt presents for initial evaluation with pain in her right knee, there is no known current injury, pt states there is swelling and buckling no clicking or snapping, pt has used an ace wrap prn. [de-identified] : certain movements, stairs ascending and descending. [de-identified] : medication Tylenol

## 2021-09-14 NOTE — DISCUSSION/SUMMARY
[de-identified] : The patient was advised of her findings and shown her x-rays.  She was informed she has mild to moderate arthritic joint space narrowing and was referred to physical therapy.  She will supplement with diclofenac and will get back to us in 3 to 4 weeks if she remains symptomatic.  She may be a candidate for cortisone or Visco supplement injection.

## 2021-09-17 ENCOUNTER — APPOINTMENT (OUTPATIENT)
Dept: DISASTER EMERGENCY | Facility: CLINIC | Age: 71
End: 2021-09-17

## 2021-09-17 ENCOUNTER — LABORATORY RESULT (OUTPATIENT)
Age: 71
End: 2021-09-17

## 2021-09-19 ENCOUNTER — APPOINTMENT (OUTPATIENT)
Dept: SLEEP CENTER | Facility: CLINIC | Age: 71
End: 2021-09-19

## 2021-09-30 ENCOUNTER — APPOINTMENT (OUTPATIENT)
Dept: UROLOGY | Facility: CLINIC | Age: 71
End: 2021-09-30
Payer: MEDICARE

## 2021-09-30 DIAGNOSIS — R31.29 OTHER MICROSCOPIC HEMATURIA: ICD-10-CM

## 2021-09-30 PROCEDURE — 99213 OFFICE O/P EST LOW 20 MIN: CPT | Mod: 25

## 2021-09-30 PROCEDURE — 52000 CYSTOURETHROSCOPY: CPT

## 2021-10-05 PROBLEM — R31.29 MICROSCOPIC HEMATURIA: Status: ACTIVE | Noted: 2021-05-26

## 2021-10-05 NOTE — ASSESSMENT
[FreeTextEntry1] : Kidney stones. No obstructing and not large enough to warrant intervention. \par --Increase fluid intake\par --Decrease salt\par --Encourage fluid intake with goal 2-3L of water.\par --Renal US eval in 1y\par \par Complex renal cyst. Discussed that with small solid renal lesions (which this is not), the risk of malignancy is not 100% and for these lesions, they are frequently observed for growth as the risk of intervention outweighs benefit and there is almost negligible risk of malignancy. Cystic lesions are of far lower malignant and metastatic potential and for something this size, observation is warranted because again, risk far outweighs potential benefit. Pts cyst is minimally complex (likely Bosniak II). will eval with renal US in 1y\par --Renal US in 1y

## 2021-10-05 NOTE — HISTORY OF PRESENT ILLNESS
[FreeTextEntry1] : 71 year old F with cc of microscopic hematuria. Pt was seen by PCP for routine exam and found to have blood in the urine. Review of UAs shows 3 and 5 RBC. She had CaOx crystals noted. She has never been told this in the past. Denies gross hematuria. Has a hx of stone x1 in the past that was tx with ESWL, >10y ago. No issues with UTIs. No tobacco hx. No exposure hx. No family hx of  malignancy. Father with colon ca. Personal hx of breast and uterine ca. Breast ca tx with mastectomy and radiation. Neither with chemo. At baseline, no urinary complaints. Can hold her urine. \par \par Plan made for eval with CTU and cysto. Cysto today negative. Reviewed CT result with pt that shows B non obstructing stones measuring up to 5mm in the left. There is also question of a minimally complex renal cyst.

## 2021-10-05 NOTE — PHYSICAL EXAM
[General Appearance - Well Developed] : well developed [General Appearance - In No Acute Distress] : no acute distress [Urethral Meatus] : normal urethra [Urinary Bladder Findings] : the bladder was normal on palpation [External Female Genitalia] : normal external genitalia [Oriented To Time, Place, And Person] : oriented to person, place, and time [Normal Station and Gait] : the gait and station were normal for the patient's age

## 2021-10-06 ENCOUNTER — APPOINTMENT (OUTPATIENT)
Dept: PULMONOLOGY | Facility: CLINIC | Age: 71
End: 2021-10-06

## 2022-02-08 ENCOUNTER — FORM ENCOUNTER (OUTPATIENT)
Age: 72
End: 2022-02-08

## 2022-02-09 ENCOUNTER — APPOINTMENT (OUTPATIENT)
Dept: SLEEP CENTER | Facility: CLINIC | Age: 72
End: 2022-02-09
Payer: MEDICARE

## 2022-02-09 ENCOUNTER — OUTPATIENT (OUTPATIENT)
Dept: OUTPATIENT SERVICES | Facility: HOSPITAL | Age: 72
LOS: 1 days | End: 2022-02-09
Payer: MEDICARE

## 2022-02-09 PROCEDURE — 95811 POLYSOM 6/>YRS CPAP 4/> PARM: CPT | Mod: 26

## 2022-02-09 PROCEDURE — 95811 POLYSOM 6/>YRS CPAP 4/> PARM: CPT

## 2022-02-16 ENCOUNTER — APPOINTMENT (OUTPATIENT)
Dept: PULMONOLOGY | Facility: CLINIC | Age: 72
End: 2022-02-16
Payer: MEDICARE

## 2022-02-16 PROCEDURE — 99442: CPT | Mod: 95

## 2022-03-01 DIAGNOSIS — G47.33 OBSTRUCTIVE SLEEP APNEA (ADULT) (PEDIATRIC): ICD-10-CM

## 2022-04-29 RX ORDER — PANTOPRAZOLE 40 MG/1
40 TABLET, DELAYED RELEASE ORAL
Qty: 1 | Refills: 0 | Status: ACTIVE | COMMUNITY
Start: 2021-06-01 | End: 1900-01-01

## 2022-06-10 ENCOUNTER — APPOINTMENT (OUTPATIENT)
Dept: PULMONOLOGY | Facility: CLINIC | Age: 72
End: 2022-06-10
Payer: MEDICARE

## 2022-06-10 ENCOUNTER — NON-APPOINTMENT (OUTPATIENT)
Age: 72
End: 2022-06-10

## 2022-06-10 VITALS
WEIGHT: 142 LBS | HEART RATE: 102 BPM | HEIGHT: 61 IN | RESPIRATION RATE: 16 BRPM | OXYGEN SATURATION: 97 % | DIASTOLIC BLOOD PRESSURE: 70 MMHG | BODY MASS INDEX: 26.81 KG/M2 | TEMPERATURE: 97.2 F | SYSTOLIC BLOOD PRESSURE: 130 MMHG

## 2022-06-10 PROCEDURE — 94010 BREATHING CAPACITY TEST: CPT

## 2022-06-10 PROCEDURE — 95012 NITRIC OXIDE EXP GAS DETER: CPT

## 2022-06-10 PROCEDURE — 99214 OFFICE O/P EST MOD 30 MIN: CPT | Mod: 25

## 2022-06-10 NOTE — PROCEDURE
[FreeTextEntry1] : PFT reveals normal flows, with an FEV1 of   2.37L, which is  119% of predicted, with normal flow volume loop \par \par FENO was 35; normal value being less than 25\par Fractional exhaled nitric oxide (FENO) is regarded as a simple, noninvasive method for assessing eosinophilic airway inflammation. Produced by a variety of cells within the lung, nitric oxide (NO) concentrations are generally low in healthy individuals. However, high concentrations of NO appear to be involved in nonspecific host defense mechanisms and chronic inflammatory diseases such as asthma. The American Thoracic Society (ATS) therefore has recommended using FENO to aid in the diagnosis and monitoring of eosinophilic airway inflammation and asthma, and for identifying steroid responsive individuals whose chronic respiratory symptoms may be airway inflammation. \par \par \par Sleep study (feb.9.2022) revealed sleep apnea with an AHI/SPENCER of 1.3, snore index of % and a low oxygen saturation of 92%\par \par Sleep study (jun.12.2021) revealed sleep apnea with an AHI/SPENCER of 33.1, snore index of % and a low oxygen saturation of 79%\par

## 2022-06-10 NOTE — ADDENDUM
[FreeTextEntry1] : Documented by Karen Vasquez acting as a scribe for Dr. Toney Bunch on 06/10/2022 \par \par All medical record entries made by the Scribe were at my, Dr. Toney Bunch's, direction and personally dictated by me on 06/10/2022 . I have reviewed the chart and agree that the record accurately reflects my personal performance of the history, physical exam, assessment and plan. I have also personally directed, reviewed, and agree with the discharge instructions.

## 2022-06-10 NOTE — HISTORY OF PRESENT ILLNESS
[TextBox_4] : Ms. MARIN is a 72 year old female presenting to the office today for f/p pulmonary evaluation. Her chief complaint is\par - she notes she has been feeling well in general \par - she has been getting enough sleep, about 7 hrs of sleep \par - weight has been stable \par - she notes she walks for exercise depending on the weather, about 3-4x a week for about 3-4 miles \par - s/p COVID-19 vaccine x2 (June/July) just had some discomfort in the arm no other sx \par - she notes she takes vitamins / supplements every morning \par - she notes she has been experiencing aches in her neck and shoulders. She notes not think its related to posture. She has discs in her neck that are protruding and wearing the CPAP mask at night does not help since it inhibits her moving. \par - she notes she is using her CPAP mask, she is compliant and she is benefiting from it. \par - She  denies any visual issues, headaches, nausea, vomiting, fever, chills, sweats, chest pains, chest pressure, diarrhea, constipation, dysphagia, myalgia, dizziness, leg swelling, leg pain, itchy eyes, itchy ears, heartburn, reflux, or sour taste in the mouth.\par

## 2022-06-10 NOTE — ASSESSMENT
[FreeTextEntry1] : Ms. MARIN is a 71 year old female with a history of breast CA 40+ years ago s/p right mastectomy, uterine CA s/p hysterectomy and oophorectomy, HTN, HLD, goiter, osteoarthritis of the both knees, anxiety, who comes into the office today for pulmonary evaluation for (+)FLORINA, snoring, (+)LPR / esophageal spasm- continued severe OSAS; on CPAP n\par \par The patient's shortness of breath is multifactorial due to:\par -pulmonary disease \par      -MENDEL\par -poor breathing mechanics \par -overweight/out of shape\par -?cardiac disease \par \par Problem 1: (+)Esophageal Spasm / (+)LPR\par -Protonix 40 mg before breakfast\par -consider esophageal motility test if symptom doesn’t clear\par \par -Rule of 2s: avoid eating too much, eating too fast, eating too late, eating too spicy, eating too lousy, eating two hours before bed.\par -Things to avoid including overeating, spicy foods, tight clothing, eating within three hours of bed, this list is not all inclusive. \par -For treatment of reflux, possible options discussed including diet control, H2 blockers, PPIs, as well as coating motility agents discussed as treatment options. Timing of meals and proximity of last meal to sleep were discussed. If symptoms persist, a formal gastrointestinal evaluation is needed. \par \par Problem 2: Snoring - (+) FLORINA severe\par -CPAP study - 7 cm, on CPAP compliant and benefiting - Dream Wear mask \par -Recommended to complete a home sleep study due to her snoring, witnessed apneas\par Sleep apnea is associated with adverse clinical consequences which an affect most organ systems. Cardiovascular disease risk includes arrhythmias, atrial fibrillation, hypertension, coronary artery disease, and stroke. Metabolic disorders include diabetes type 2, non-alcoholic fatty liver disease. Mood disorder especially depression; and cognitive decline especially in the elderly. Associations with chronic reflux/Foley’s esophagus some but not all inclusive. \par -Reasons include arousal consistent with hypopnea; respiratory events most prominent in REM sleep or supine position; therefore sleep staging and body position are important for accurate diagnosis and estimation of AHI. \par \par Problem 3: Poor Mechanics of Breathing\par -Recommended Wim Hof and Buteyko breathing techniques \par - Proper breathing techniques were reviewed with an emphasis of exhalation. Patient instructed to breath in for 1 second and out for four seconds. Patient was encouraged to not talk while walking. \par \par Problem 4: Overweight (in progress)\par - recommended the book "10-day detox" by Sammy Padilla \par -Weight loss, exercise, and diet control were discussed and are highly encouraged. Treatment options were given such as, aqua therapy, and contacting a nutritionist. Recommended to use the elliptical, stationary bike, less use of treadmill. Mindful eating was explained to the patient Obesity is associated with worsening asthma, shortness of breath, and potential for cardiac disease, diabetes, and other underlying medical conditions. \par \par Problem 5: Cardiac\par - S/p Covid 19 vaccine (Pfizer) x2 \par -recommended to follow up with a cardiologist (Arpan)\par \par Problem 6: health maintenance\par -s/p influenza vaccine\par -recommended strep pneumonia vaccines after age 65: Prevnar-13 vaccine, followed by Pneumo vaccine 23 one year following\par -recommended early intervention for URIs\par -recommended regular osteoporosis evaluations\par -recommended early dermatological evaluations\par -recommended after the age of 50 to the age of 70, colonoscopy every 5 years \par \par  Follow up in 6-8 weeks\par -he  is recommended to call with any changes, questions, or concerns.

## 2022-08-10 ENCOUNTER — APPOINTMENT (OUTPATIENT)
Dept: OTOLARYNGOLOGY | Facility: CLINIC | Age: 72
End: 2022-08-10

## 2022-08-10 VITALS
HEIGHT: 61 IN | BODY MASS INDEX: 26.43 KG/M2 | HEART RATE: 82 BPM | SYSTOLIC BLOOD PRESSURE: 158 MMHG | WEIGHT: 140 LBS | DIASTOLIC BLOOD PRESSURE: 84 MMHG

## 2022-08-10 DIAGNOSIS — L29.9 PRURITUS, UNSPECIFIED: ICD-10-CM

## 2022-08-10 DIAGNOSIS — H93.8X3 OTHER SPECIFIED DISORDERS OF EAR, BILATERAL: ICD-10-CM

## 2022-08-10 DIAGNOSIS — H61.23 IMPACTED CERUMEN, BILATERAL: ICD-10-CM

## 2022-08-10 PROCEDURE — 69210 REMOVE IMPACTED EAR WAX UNI: CPT

## 2022-08-10 PROCEDURE — 99214 OFFICE O/P EST MOD 30 MIN: CPT | Mod: 25

## 2022-08-10 NOTE — HISTORY OF PRESENT ILLNESS
[de-identified] : Patient is here today with a Qtip stuck in the right ear, but both ears feel clogged. SHe cleaned her ears a few weeks ago with Qtip and feels that the cotton tip never came back out of the right ear. \par She denies pain but admits to moderate muffling in both ears. \par She does not have any ringing in the ears or dizziness. \par She has not put any drops in the ears since this happened. \par She has itching in both ears, which she has had intermittently since she was seen in 2020. She has used the mometasone cream for itching with no benefit.

## 2022-08-10 NOTE — END OF VISIT
[FreeTextEntry3] : I, Dr. Lloyd personally performed the evaluation and management (E/M) services , including all procedures, for this established patient who presents today with (a) new problem(s)/exacerbation of (an) existing condition(s). That E/M includes conducting the clinically appropriate interval history &/or exam, assessing all new/exacerbated conditions, and establishing a new plan of care. Today, my HERNANDEZ, Radha Neal, was here to observe &/or participate in the visit & follow plan of care established by me.\par \par \par

## 2022-08-10 NOTE — ASSESSMENT
[FreeTextEntry1] : Patient with Q-tip stuck in her right ear removed and then there was another 1 behind it and then some wax which was removed ear canal somewhat irritated we gave her some Ciprodex to the right ear the left ear was also cleaned out with cerumen she is going on vacation she will follow-up and see us after she returns.

## 2022-09-21 ENCOUNTER — APPOINTMENT (OUTPATIENT)
Dept: OTOLARYNGOLOGY | Facility: CLINIC | Age: 72
End: 2022-09-21

## 2022-09-21 VITALS
SYSTOLIC BLOOD PRESSURE: 150 MMHG | BODY MASS INDEX: 26.43 KG/M2 | WEIGHT: 140 LBS | HEIGHT: 61 IN | TEMPERATURE: 98.5 F | HEART RATE: 64 BPM | DIASTOLIC BLOOD PRESSURE: 83 MMHG

## 2022-09-21 DIAGNOSIS — H69.83 OTHER SPECIFIED DISORDERS OF EUSTACHIAN TUBE, BILATERAL: ICD-10-CM

## 2022-09-21 PROCEDURE — 99213 OFFICE O/P EST LOW 20 MIN: CPT

## 2022-09-21 NOTE — HISTORY OF PRESENT ILLNESS
[de-identified] : Patient is here today following up for her ears. She does not have any pain in the ears and has not had any itching in the ears. SHe is overall doing well with no complaints. She has no longer been using Qtips. \par She denies nasal congestion or runny nose complaints

## 2022-09-28 ENCOUNTER — APPOINTMENT (OUTPATIENT)
Dept: PULMONOLOGY | Facility: CLINIC | Age: 72
End: 2022-09-28

## 2022-09-28 ENCOUNTER — NON-APPOINTMENT (OUTPATIENT)
Age: 72
End: 2022-09-28

## 2022-09-28 VITALS
HEIGHT: 61 IN | SYSTOLIC BLOOD PRESSURE: 140 MMHG | OXYGEN SATURATION: 98 % | RESPIRATION RATE: 16 BRPM | HEART RATE: 74 BPM | WEIGHT: 140 LBS | DIASTOLIC BLOOD PRESSURE: 75 MMHG | TEMPERATURE: 97.5 F | BODY MASS INDEX: 26.43 KG/M2

## 2022-09-28 DIAGNOSIS — R06.02 SHORTNESS OF BREATH: ICD-10-CM

## 2022-09-28 DIAGNOSIS — G47.33 OBSTRUCTIVE SLEEP APNEA (ADULT) (PEDIATRIC): ICD-10-CM

## 2022-09-28 DIAGNOSIS — K21.9 GASTRO-ESOPHAGEAL REFLUX DISEASE W/OUT ESOPHAGITIS: ICD-10-CM

## 2022-09-28 DIAGNOSIS — F41.9 ANXIETY DISORDER, UNSPECIFIED: ICD-10-CM

## 2022-09-28 PROCEDURE — 99214 OFFICE O/P EST MOD 30 MIN: CPT | Mod: 25

## 2022-09-28 PROCEDURE — 95012 NITRIC OXIDE EXP GAS DETER: CPT

## 2022-09-28 PROCEDURE — 94010 BREATHING CAPACITY TEST: CPT

## 2022-09-28 RX ORDER — CIPROFLOXACIN AND DEXAMETHASONE 3; 1 MG/ML; MG/ML
0.3-0.1 SUSPENSION/ DROPS AURICULAR (OTIC)
Qty: 1 | Refills: 3 | Status: DISCONTINUED | COMMUNITY
Start: 2022-08-10 | End: 2022-09-28

## 2022-09-28 NOTE — PROCEDURE
[FreeTextEntry1] : PFT reveals normal flows, with an FEV1 of  2.44L, which is 123% of predicted, with a normal flow volume loop. \par \par FENO was 25; a normal value being less than 25\par Fractional exhaled nitric oxide (FENO) is regarded as a simple, noninvasive method for assessing eosinophilic airway inflammation. Produced by a variety of cells within the lung, nitric oxide (NO) concentrations are generally low in healthy individuals. However, high concentrations of NO appear to be involved in nonspecific host defense mechanisms and chronic inflammatory diseases such as asthma. The American Thoracic Society (ATS) therefore has recommended using FENO to aid in the diagnosis and monitoring of eosinophilic airway inflammation and asthma, and for identifying steroid responsive individuals whose chronic respiratory symptoms may be caused by airway inflammation.

## 2022-09-28 NOTE — ASSESSMENT
[FreeTextEntry1] : Ms. MARIN is a 72 year old female with a history of breast CA 40+ years ago s/p right mastectomy, uterine CA s/p hysterectomy and oophorectomy, HTN, HLD, goiter, osteoarthritis of the both knees, anxiety, who comes into the office today for pulmonary evaluation for (+)FLORINA, snoring, (+)LPR / esophageal spasm- continued severe OSAS; on CPAP (tolerating well)\par \par The patient's shortness of breath is multifactorial due to:\par -pulmonary disease \par      -MENDEL\par -poor breathing mechanics \par -overweight/out of shape\par -?cardiac disease \par \par Problem 1: (+)Esophageal Spasm / (+)LPR\par -Protonix 40 mg before breakfast\par -consider esophageal motility test if symptom doesn’t clear\par \par -Rule of 2s: avoid eating too much, eating too fast, eating too late, eating too spicy, eating too lousy, eating two hours before bed.\par -Things to avoid including overeating, spicy foods, tight clothing, eating within three hours of bed, this list is not all inclusive. \par -For treatment of reflux, possible options discussed including diet control, H2 blockers, PPIs, as well as coating motility agents discussed as treatment options. Timing of meals and proximity of last meal to sleep were discussed. If symptoms persist, a formal gastrointestinal evaluation is needed. \par \par Problem 2: Snoring - (+) FLORINA severe\par -CPAP study - 7 cm, on CPAP compliant and benefiting - Dream Wear mask \par -Recommended to complete a home sleep study due to her snoring, witnessed apneas\par Sleep apnea is associated with adverse clinical consequences which an affect most organ systems. Cardiovascular disease risk includes arrhythmias, atrial fibrillation, hypertension, coronary artery disease, and stroke. Metabolic disorders include diabetes type 2, non-alcoholic fatty liver disease. Mood disorder especially depression; and cognitive decline especially in the elderly. Associations with chronic reflux/Foley’s esophagus some but not all inclusive. \par -Reasons include arousal consistent with hypopnea; respiratory events most prominent in REM sleep or supine position; therefore sleep staging and body position are important for accurate diagnosis and estimation of AHI. \par \par Problem 3: Poor Mechanics of Breathing\par -Recommended Wim Hof and Buteyko breathing techniques \par - Proper breathing techniques were reviewed with an emphasis of exhalation. Patient instructed to breath in for 1 second and out for four seconds. Patient was encouraged to not talk while walking. \par \par Problem 4: Overweight (in progress)\par - recommended the book "10-day detox" by Sammy Padilla \par -Weight loss, exercise, and diet control were discussed and are highly encouraged. Treatment options were given such as, aqua therapy, and contacting a nutritionist. Recommended to use the elliptical, stationary bike, less use of treadmill. Mindful eating was explained to the patient Obesity is associated with worsening asthma, shortness of breath, and potential for cardiac disease, diabetes, and other underlying medical conditions. \par \par Problem 5: Cardiac\par -recommended to follow up with a cardiologist (Arpan)\par \par Problem 6: health maintenance\par -recommended SaNOtize nasal spray \par - S/p Covid 19 vaccine (Pfizer) x2 \par -refused flu shot\par -recommended strep pneumonia vaccines after age 65: Prevnar-13 vaccine, followed by Pneumo vaccine 23 one year following (refused)\par -recommended early intervention for URIs\par -recommended regular osteoporosis evaluations\par -recommended early dermatological evaluations\par -recommended after the age of 50 to the age of 70, colonoscopy every 5 years \par \par  Follow up in 6-8 weeks\par -he  is recommended to call with any changes, questions, or concerns.

## 2022-09-28 NOTE — ADDENDUM
[FreeTextEntry1] : Documented by JOSE Castillo acting as a scribe for Dr. Toney Bunch on 09/28/2022 .\par All medical record entries made by the Scribe were at my, Dr. Toney Bunch's, direction and personally dictated by me on 09/28/2022. I have reviewed the chart and agree that the record accurately reflects my personal performance of the history, physical exam, assessment and plan. I have also personally directed, reviewed, and agree with the discharge instructions.

## 2022-09-28 NOTE — HISTORY OF PRESENT ILLNESS
[TextBox_4] : Ms. MARIN is a 72 year old female presenting to the office today for f/p pulmonary evaluation. Her chief complaint is\par \par -she notes energy levels are sufficient\par -she notes exercising (walking every day 3-4mi- weather permitting) without limitations\par -she notes adequate hydration (8-10 cups)\par -she notes constipation\par -she notes vision is stable \par -she denies dysphonia \par -she notes prior reflux that has since resolved\par -she notes diet is stable\par -she notes weight is stable \par -s/p COVID-19 vaccine x2\par -she notes recent travel to [place]\par -she notes sleeping for 7-8hrs\par -she notes sleep is interrupted by CPAP apparatus\par -she notes tolerating CPAP well\par \par -she denies any headaches, nausea, vomiting, fever, chills, sweats, chest pain, chest pressure, coughing, wheezing, palpitations, diarrhea, dizziness, dysphagia, heartburn, reflux, itchy eyes, itchy ears, leg swelling, leg pain, arthralgias, myalgias, or sour taste in the mouth.

## 2022-09-30 ENCOUNTER — APPOINTMENT (OUTPATIENT)
Dept: UROLOGY | Facility: CLINIC | Age: 72
End: 2022-09-30

## 2022-09-30 ENCOUNTER — OUTPATIENT (OUTPATIENT)
Dept: OUTPATIENT SERVICES | Facility: HOSPITAL | Age: 72
LOS: 1 days | End: 2022-09-30
Payer: MEDICARE

## 2022-09-30 DIAGNOSIS — N28.1 CYST OF KIDNEY, ACQUIRED: ICD-10-CM

## 2022-09-30 DIAGNOSIS — N20.0 CALCULUS OF KIDNEY: ICD-10-CM

## 2022-09-30 DIAGNOSIS — R35.0 FREQUENCY OF MICTURITION: ICD-10-CM

## 2022-09-30 PROCEDURE — 76775 US EXAM ABDO BACK WALL LIM: CPT

## 2022-09-30 PROCEDURE — 76775 US EXAM ABDO BACK WALL LIM: CPT | Mod: 26

## 2022-09-30 PROCEDURE — 99213 OFFICE O/P EST LOW 20 MIN: CPT

## 2022-09-30 NOTE — PHYSICAL EXAM
[General Appearance - Well Developed] : well developed [General Appearance - In No Acute Distress] : no acute distress [Oriented To Time, Place, And Person] : oriented to person, place, and time [Normal Station and Gait] : the gait and station were normal for the patient's age [Abdomen Soft] : soft [Abdomen Tenderness] : non-tender [Costovertebral Angle Tenderness] : no ~M costovertebral angle tenderness

## 2022-10-04 PROBLEM — N20.0 KIDNEY STONES: Status: ACTIVE | Noted: 2021-09-30

## 2022-10-04 PROBLEM — N28.1 COMPLEX RENAL CYST: Status: ACTIVE | Noted: 2021-10-05

## 2022-10-04 NOTE — ASSESSMENT
[FreeTextEntry1] : Kidney stones. No obstructing and not large enough to warrant intervention. \par --Increase fluid intake\par --Decrease salt\par --Encourage fluid intake with goal 2-3L of water.\par --Renal US eval in 1-2y\par \par Complex renal cyst. Discussed that with small solid renal lesions (which this is not), the risk of malignancy is not 100% and for these lesions, they are frequently observed for growth as the risk of intervention outweighs benefit and there is almost negligible risk of malignancy. Cystic lesions are of far lower malignant and metastatic potential and for something this size, observation is warranted because again, risk far outweighs potential benefit. Pts cyst is minimally complex (likely Bosniak II). \par --Renal US in 1-2y

## 2022-10-04 NOTE — HISTORY OF PRESENT ILLNESS
[FreeTextEntry1] : 71 year old F with cc of microscopic hematuria. Pt was seen by PCP for routine exam and found to have blood in the urine. Review of UAs shows 3 and 5 RBC. She had CaOx crystals noted. She has never been told this in the past. Denies gross hematuria. Has a hx of stone x1 in the past that was tx with ESWL, >10y ago. No issues with UTIs. No tobacco hx. No exposure hx. No family hx of  malignancy. Father with colon ca. Personal hx of breast and uterine ca. Breast ca tx with mastectomy and radiation. Neither with chemo. At baseline, no urinary complaints. Can hold her urine. Cysto was negative. CT 7/2021 showed B non obstructing stones measuring up to 5mm in the left. There is also question of a minimally complex renal cyst. \par \par US today shows unchanged B stones. No concerning findings on cysts. No flank pain. No hematuria.

## 2022-10-05 DIAGNOSIS — N20.0 CALCULUS OF KIDNEY: ICD-10-CM

## 2022-10-05 DIAGNOSIS — N28.1 CYST OF KIDNEY, ACQUIRED: ICD-10-CM

## 2024-10-18 ENCOUNTER — APPOINTMENT (OUTPATIENT)
Dept: OTOLARYNGOLOGY | Facility: CLINIC | Age: 74
End: 2024-10-18
Payer: MEDICARE

## 2024-10-18 VITALS
BODY MASS INDEX: 24.73 KG/M2 | WEIGHT: 131 LBS | HEIGHT: 61 IN | HEART RATE: 85 BPM | SYSTOLIC BLOOD PRESSURE: 147 MMHG | DIASTOLIC BLOOD PRESSURE: 74 MMHG

## 2024-10-18 DIAGNOSIS — H61.22 IMPACTED CERUMEN, LEFT EAR: ICD-10-CM

## 2024-10-18 DIAGNOSIS — H90.3 SENSORINEURAL HEARING LOSS, BILATERAL: ICD-10-CM

## 2024-10-18 DIAGNOSIS — L29.9 PRURITUS, UNSPECIFIED: ICD-10-CM

## 2024-10-18 PROCEDURE — 92557 COMPREHENSIVE HEARING TEST: CPT

## 2024-10-18 PROCEDURE — 92567 TYMPANOMETRY: CPT

## 2024-10-18 PROCEDURE — 69210 REMOVE IMPACTED EAR WAX UNI: CPT

## 2024-10-18 PROCEDURE — 99213 OFFICE O/P EST LOW 20 MIN: CPT | Mod: 25

## 2024-10-18 RX ORDER — MOMETASONE FUROATE 1 MG/G
0.1 CREAM TOPICAL
Qty: 1 | Refills: 0 | Status: ACTIVE | COMMUNITY
Start: 2024-10-18 | End: 1900-01-01

## 2024-10-18 RX ORDER — OFLOXACIN OTIC 3 MG/ML
0.3 SOLUTION AURICULAR (OTIC) TWICE DAILY
Qty: 1 | Refills: 2 | Status: ACTIVE | COMMUNITY
Start: 2024-10-18 | End: 1900-01-01

## 2024-12-25 PROBLEM — F10.90 ALCOHOL USE: Status: ACTIVE | Noted: 2017-01-17

## 2025-01-16 ENCOUNTER — APPOINTMENT (OUTPATIENT)
Dept: OTOLARYNGOLOGY | Facility: CLINIC | Age: 75
End: 2025-01-16
Payer: MEDICARE

## 2025-01-16 VITALS
WEIGHT: 131 LBS | HEART RATE: 81 BPM | DIASTOLIC BLOOD PRESSURE: 82 MMHG | BODY MASS INDEX: 24.73 KG/M2 | HEIGHT: 61 IN | SYSTOLIC BLOOD PRESSURE: 181 MMHG

## 2025-01-16 DIAGNOSIS — H90.3 SENSORINEURAL HEARING LOSS, BILATERAL: ICD-10-CM

## 2025-01-16 PROCEDURE — 99213 OFFICE O/P EST LOW 20 MIN: CPT | Mod: 25

## 2025-01-16 PROCEDURE — 92557 COMPREHENSIVE HEARING TEST: CPT

## 2025-01-16 PROCEDURE — 69210 REMOVE IMPACTED EAR WAX UNI: CPT

## 2025-01-16 PROCEDURE — 92567 TYMPANOMETRY: CPT

## 2025-04-17 ENCOUNTER — APPOINTMENT (OUTPATIENT)
Dept: OTOLARYNGOLOGY | Facility: CLINIC | Age: 75
End: 2025-04-17
Payer: MEDICARE

## 2025-04-17 VITALS — SYSTOLIC BLOOD PRESSURE: 155 MMHG | TEMPERATURE: 98 F | HEART RATE: 95 BPM | DIASTOLIC BLOOD PRESSURE: 75 MMHG

## 2025-04-17 DIAGNOSIS — L29.9 PRURITUS, UNSPECIFIED: ICD-10-CM

## 2025-04-17 PROCEDURE — 99213 OFFICE O/P EST LOW 20 MIN: CPT

## 2025-04-17 RX ORDER — OFLOXACIN OTIC 3 MG/ML
0.3 SOLUTION AURICULAR (OTIC) TWICE DAILY
Qty: 1 | Refills: 2 | Status: ACTIVE | COMMUNITY
Start: 2025-04-17 | End: 1900-01-01